# Patient Record
Sex: MALE | Race: WHITE | Employment: FULL TIME | URBAN - METROPOLITAN AREA
[De-identification: names, ages, dates, MRNs, and addresses within clinical notes are randomized per-mention and may not be internally consistent; named-entity substitution may affect disease eponyms.]

---

## 2020-01-08 ENCOUNTER — HISTORICAL (OUTPATIENT)
Dept: RADIOLOGY | Facility: HOSPITAL | Age: 45
End: 2020-01-08

## 2024-06-28 DIAGNOSIS — Z12.9 CANCER SCREENING: ICD-10-CM

## 2024-06-28 DIAGNOSIS — Z00.00 GENERAL MEDICAL EXAM: Primary | ICD-10-CM

## 2024-07-01 DIAGNOSIS — Z00.00 GENERAL MEDICAL EXAM: Primary | ICD-10-CM

## 2024-07-01 DIAGNOSIS — Z12.9 CANCER SCREENING: ICD-10-CM

## 2024-07-12 ENCOUNTER — CLINICAL SUPPORT (OUTPATIENT)
Dept: INTERNAL MEDICINE | Facility: CLINIC | Age: 49
End: 2024-07-12
Payer: OTHER GOVERNMENT

## 2024-07-12 VITALS
WEIGHT: 228 LBS | TEMPERATURE: 98 F | DIASTOLIC BLOOD PRESSURE: 102 MMHG | HEART RATE: 64 BPM | SYSTOLIC BLOOD PRESSURE: 146 MMHG | BODY MASS INDEX: 32.64 KG/M2 | HEIGHT: 70 IN | OXYGEN SATURATION: 98 %

## 2024-07-12 DIAGNOSIS — R06.83 LOUD SNORING: ICD-10-CM

## 2024-07-12 DIAGNOSIS — R79.89 LOW TESTOSTERONE IN MALE: ICD-10-CM

## 2024-07-12 DIAGNOSIS — K76.0 STEATOSIS, LIVER: ICD-10-CM

## 2024-07-12 DIAGNOSIS — Z12.11 SCREENING FOR COLORECTAL CANCER: ICD-10-CM

## 2024-07-12 DIAGNOSIS — Z12.9 CANCER SCREENING: ICD-10-CM

## 2024-07-12 DIAGNOSIS — Z12.12 SCREENING FOR COLORECTAL CANCER: ICD-10-CM

## 2024-07-12 DIAGNOSIS — E78.2 ELEVATED TRIGLYCERIDES WITH HIGH CHOLESTEROL: ICD-10-CM

## 2024-07-12 DIAGNOSIS — R03.0 ELEVATED BLOOD PRESSURE READING: ICD-10-CM

## 2024-07-12 DIAGNOSIS — R07.89 ATYPICAL CHEST PAIN: ICD-10-CM

## 2024-07-12 DIAGNOSIS — Z00.00 GENERAL MEDICAL EXAM: Primary | ICD-10-CM

## 2024-07-12 LAB
(HCYS)2 SERPL-MCNC: 13.1 UMOL/L (ref 5.1–15.4)
25(OH)D3+25(OH)D2 SERPL-MCNC: 32 NG/ML (ref 30–80)
ALBUMIN SERPL-MCNC: 3.9 G/DL (ref 3.5–5)
ALBUMIN/GLOB SERPL: 1.1 RATIO (ref 1.1–2)
ALP SERPL-CCNC: 71 UNIT/L (ref 40–150)
ALT SERPL-CCNC: 41 UNIT/L (ref 0–55)
ANION GAP SERPL CALC-SCNC: 8 MEQ/L
AST SERPL-CCNC: 22 UNIT/L (ref 5–34)
BACTERIA #/AREA URNS AUTO: ABNORMAL /HPF
BASOPHILS # BLD AUTO: 0.06 X10(3)/MCL
BASOPHILS NFR BLD AUTO: 1.1 %
BILIRUB SERPL-MCNC: 1.1 MG/DL
BILIRUB UR QL STRIP.AUTO: NEGATIVE
BUN SERPL-MCNC: 16.4 MG/DL (ref 8.9–20.6)
CALCIUM SERPL-MCNC: 9.4 MG/DL (ref 8.4–10.2)
CHLORIDE SERPL-SCNC: 102 MMOL/L (ref 98–107)
CHOLEST SERPL-MCNC: 220 MG/DL
CHOLEST/HDLC SERPL: 4 {RATIO} (ref 0–5)
CK SERPL-CCNC: 131 U/L (ref 30–200)
CLARITY UR: CLEAR
CO2 SERPL-SCNC: 26 MMOL/L (ref 22–29)
COLOR UR AUTO: YELLOW
CREAT SERPL-MCNC: 1.07 MG/DL (ref 0.73–1.18)
CREAT/UREA NIT SERPL: 15
CRP SERPL HS-MCNC: 3.48 MG/L
DLCO: NORMAL
EOSINOPHIL # BLD AUTO: 0.11 X10(3)/MCL (ref 0–0.9)
EOSINOPHIL NFR BLD AUTO: 2 %
ERYTHROCYTE [DISTWIDTH] IN BLOOD BY AUTOMATED COUNT: 12.7 % (ref 11.5–17)
EST. AVERAGE GLUCOSE BLD GHB EST-MCNC: 114 MG/DL
FERRITIN SERPL-MCNC: 338.82 NG/ML (ref 21.81–274.66)
FEV1/FVC: 72.5 %
FEV1: 3.21 LITERS
FVC: 4.43 LITERS
GFR SERPLBLD CREATININE-BSD FMLA CKD-EPI: >60 ML/MIN/1.73/M2
GLOBULIN SER-MCNC: 3.4 GM/DL (ref 2.4–3.5)
GLUCOSE SERPL-MCNC: 97 MG/DL (ref 74–100)
GLUCOSE UR QL STRIP: NORMAL
GROUP & RH: NORMAL
HBA1C MFR BLD: 5.6 %
HCT VFR BLD AUTO: 49 % (ref 42–52)
HCV AB SERPL QL IA: NONREACTIVE
HDLC SERPL-MCNC: 58 MG/DL (ref 35–60)
HGB BLD-MCNC: 17.3 G/DL (ref 14–18)
HGB UR QL STRIP: NEGATIVE
HIV 1+2 AB+HIV1 P24 AG SERPL QL IA: NONREACTIVE
HYALINE CASTS #/AREA URNS LPF: ABNORMAL /LPF
IMM GRANULOCYTES # BLD AUTO: 0.01 X10(3)/MCL (ref 0–0.04)
IMM GRANULOCYTES NFR BLD AUTO: 0.2 %
KETONES UR QL STRIP: NEGATIVE
LDLC SERPL CALC-MCNC: 113 MG/DL (ref 50–140)
LEUKOCYTE ESTERASE UR QL STRIP: NEGATIVE
LYMPHOCYTES # BLD AUTO: 1.67 X10(3)/MCL (ref 0.6–4.6)
LYMPHOCYTES NFR BLD AUTO: 30.5 %
MCH RBC QN AUTO: 31.7 PG (ref 27–31)
MCHC RBC AUTO-ENTMCNC: 35.3 G/DL (ref 33–36)
MCV RBC AUTO: 89.7 FL (ref 80–94)
MONOCYTES # BLD AUTO: 0.64 X10(3)/MCL (ref 0.1–1.3)
MONOCYTES NFR BLD AUTO: 11.7 %
MUCOUS THREADS URNS QL MICRO: ABNORMAL /LPF
NEUTROPHILS # BLD AUTO: 2.99 X10(3)/MCL (ref 2.1–9.2)
NEUTROPHILS NFR BLD AUTO: 54.5 %
NITRITE UR QL STRIP: NEGATIVE
NRBC BLD AUTO-RTO: 0 %
OHS QRS DURATION: 100 MS
OHS QTC CALCULATION: 407 MS
PH UR STRIP: 5 [PH]
PLATELET # BLD AUTO: 205 X10(3)/MCL (ref 130–400)
PMV BLD AUTO: 11.4 FL (ref 7.4–10.4)
POTASSIUM SERPL-SCNC: 4.1 MMOL/L (ref 3.5–5.1)
PROT SERPL-MCNC: 7.3 GM/DL (ref 6.4–8.3)
PROT UR QL STRIP: NEGATIVE
PSA SERPL-MCNC: 0.75 NG/ML
RBC # BLD AUTO: 5.46 X10(6)/MCL (ref 4.7–6.1)
RBC #/AREA URNS AUTO: ABNORMAL /HPF
SODIUM SERPL-SCNC: 136 MMOL/L (ref 136–145)
SP GR UR STRIP.AUTO: 1.02 (ref 1–1.03)
SQUAMOUS #/AREA URNS LPF: ABNORMAL /HPF
T3FREE SERPL-MCNC: 3.36 PG/ML (ref 1.58–3.91)
T4 FREE SERPL-MCNC: 0.88 NG/DL (ref 0.7–1.48)
TESTOST SERPL-MCNC: 225.85 NG/DL (ref 240.24–870.68)
TLC: NORMAL
TRIGL SERPL-MCNC: 247 MG/DL (ref 34–140)
TSH SERPL-ACNC: 3.34 UIU/ML (ref 0.35–4.94)
URATE SERPL-MCNC: 6.8 MG/DL (ref 3.5–7.2)
UROBILINOGEN UR STRIP-ACNC: NORMAL
VLDLC SERPL CALC-MCNC: 49 MG/DL
WBC # BLD AUTO: 5.48 X10(3)/MCL (ref 4.5–11.5)
WBC #/AREA URNS AUTO: ABNORMAL /HPF

## 2024-07-12 PROCEDURE — 84443 ASSAY THYROID STIM HORMONE: CPT

## 2024-07-12 PROCEDURE — 82728 ASSAY OF FERRITIN: CPT

## 2024-07-12 PROCEDURE — 84481 FREE ASSAY (FT-3): CPT

## 2024-07-12 PROCEDURE — 86901 BLOOD TYPING SEROLOGIC RH(D): CPT | Performed by: FAMILY MEDICINE

## 2024-07-12 PROCEDURE — 84153 ASSAY OF PSA TOTAL: CPT

## 2024-07-12 PROCEDURE — 83090 ASSAY OF HOMOCYSTEINE: CPT

## 2024-07-12 PROCEDURE — 86900 BLOOD TYPING SEROLOGIC ABO: CPT | Performed by: FAMILY MEDICINE

## 2024-07-12 PROCEDURE — 84403 ASSAY OF TOTAL TESTOSTERONE: CPT

## 2024-07-12 PROCEDURE — 87389 HIV-1 AG W/HIV-1&-2 AB AG IA: CPT

## 2024-07-12 PROCEDURE — 85025 COMPLETE CBC W/AUTO DIFF WBC: CPT

## 2024-07-12 PROCEDURE — 93010 ELECTROCARDIOGRAM REPORT: CPT | Mod: ,,, | Performed by: INTERNAL MEDICINE

## 2024-07-12 PROCEDURE — 86803 HEPATITIS C AB TEST: CPT

## 2024-07-12 PROCEDURE — 84439 ASSAY OF FREE THYROXINE: CPT

## 2024-07-12 PROCEDURE — 83036 HEMOGLOBIN GLYCOSYLATED A1C: CPT

## 2024-07-12 PROCEDURE — 81001 URINALYSIS AUTO W/SCOPE: CPT

## 2024-07-12 PROCEDURE — 86141 C-REACTIVE PROTEIN HS: CPT

## 2024-07-12 PROCEDURE — 80061 LIPID PANEL: CPT

## 2024-07-12 PROCEDURE — 82550 ASSAY OF CK (CPK): CPT

## 2024-07-12 PROCEDURE — 82306 VITAMIN D 25 HYDROXY: CPT

## 2024-07-12 PROCEDURE — 84550 ASSAY OF BLOOD/URIC ACID: CPT

## 2024-07-12 PROCEDURE — 80053 COMPREHEN METABOLIC PANEL: CPT

## 2024-07-12 NOTE — ASSESSMENT & PLAN NOTE
Monitor your blood pressure at home. Use a mid range over the counter blood pressure arm cuff. Take your blood pressure daily or every other day both in the morning and late afternoons. Report consistently elevated blood pressures to your primary care physician. These would be blood pressure readings where the top number is above 140 and/or the bottom number is above 90.     The exercise treadmill stress test will be deferred today due to his elevated blood pressure, especially the diastolic >100. When his blood pressure is controlled, we can set him up for the ETT to be done in our office.

## 2024-07-12 NOTE — ASSESSMENT & PLAN NOTE
Maintain a healthy weight and reduce the intake of simple carbohydrates, especially high-glycemic and high-fructose foods and beverages. Reduce the amount of saturated fats in your diet and increase your consumption of fish that contain high amounts of omega-3 fatty acids.

## 2024-07-12 NOTE — ASSESSMENT & PLAN NOTE
Recommend sleep study to evaluate for obstructive sleep apnea. Discuss with your primary care physician.

## 2024-07-12 NOTE — ASSESSMENT & PLAN NOTE
Further workup is needed.   In the meantime:  -Abstain from alcohol, in particular, recommend avoiding heavy alcohol use (I.e. >14 drinks/week or >4 drinks on a given day)  -Start a weight loss routine and aim to lose 5-7% of body weight at a rate of 1-2 lbs/week.

## 2024-07-12 NOTE — ASSESSMENT & PLAN NOTE
Discussed colorectal cancer screening options. Patient would like to wait to discuss further with his PCP and wishes to get this done at a later date.

## 2024-07-12 NOTE — PROGRESS NOTES
Counts include 234 beds at the [facility]  Angeline Flynn MD      Subjective      Alexander Soto is a 49 y.o. year old male, who presents today for a preventive medical evaluation.    This is Mr. Balderrama's first visit to the [facility] for a wellness visit. He reports that since he was last seen by his physician, which was several years ago, he has had no major illnesses or injuries.     He states that in 2011 or 2012 he was evaluated by a cardiologist for intermittent chest pain. At that time he had cardiac testing which included a stress test and ECG which was normal. It was concluded that his chest pain was likely due to stress. Since that time he states that he has always had this intermittent chest pain. He reports that it is unchanged over the years. It is described as substernal, non radiating and mild in nature. It usually occurs in the late afternoons and brought on by stress. He denies any associated shortness of breath, sweating or nausea. He has no discomfort with exercise or night time symptoms.     He reports that his wife states that he snores loudly and has witnessed pauses in his breathing. He states he has daytime fatigue, no headaches and no sleepiness.     He is due for his colorectal cancer screening. He denies no changes in his bowel habits. He recent reports of blood in stools or dark tarry stools. No family history of colon cancer.       CURRENT MEDICAL PROBLEMS     He reports no medical problems.    Current Medications  No current outpatient medications on file.     Care Team 274}  Patient Care Team:  No, Primary Doctor as PCP - General  No, Primary Doctor      PAST MEDICAL HISTORY   274}  Immunizations:  He has not his tetanus booster in the last 10 years.  He has not received the annual flu vaccine.  He  has not taken his Covid Vaccine.     Drug Allergies:274}  Review of patient's allergies indicates:  No Known Allergies     Medical Illnesses:  See Current Medical Problems  and Review of System.    Surgical Illnesses:   History reviewed. No pertinent surgical history.     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 He lives at home with his wife and their 5 children. He is retired from the [employer].     Personal Habits  He does not smoke. He uses smokeless tobacco daily and gets regular dental examinations. He drinks approximately 14 alcoholic beverages per week. He drinks approximately 15 caffeinated beverages per week. He reports that he is currently following a nonrestrictive diet. Nearly all of his meals are prepared at home and eats out 4 times per week. He drinks 25 glasses of water a week. On average, he obtains 5.5 hours of sleep each night.    Exercise History  Currently, he does not follow any exercise routine.     Stress Factors  The patient considers his home life to be moderate stress and his occupation to be of high stress. His greatest source of worry or concern is his job, health, and the state of the country . He feels that he manages his stress very well most of the time. He rates his emotional outlook on life as happy and sad in equal amounts. He rates his overall health as fair.    FAMILY HISTORY     The patient's father is 75 years old and has hypertension and diabetes. His mother is also 75 years old and has hypertension. She had a recent heart surgery for a valve replacement. He has 3 siblings, ages 40, 43, and 53 have no known medical issues. He has 5 children, ages 7, 10, 13, 17 and 19, who are all healthy.    REVIEW OF SYSTEMS     Review of Systems   Constitutional: No fever, no chills, no sweats, no weakness, + fatigue  Eye: No recent visual problems, no blurry vision, no visual disturbances  ENT: No ear pain, no change in hearing, no nasal congestion, no sore throat  Respiratory: No shortness of breath, no cough, no wheezing, no snoring  Cardiovascular: + chest  "pain--See HPI, no palpitations, no peripheral edema, no calf pain or swelling  Gastrointestinal: No nausea, no vomiting, no diarrhea, no constipation, no abdominal pain, no melena or rectal bleeding  Male : No difficulty with urine stream, strength or flow rate. No polyuria, + [id number] night and unchanged, no sexual problems  Genitourinary: No dysuria, no hematuria, no excessive urination, no frequent UTIs, no urinary incontinence  Immunologic: No recurrent fevers, no malaise  Hematology/lymphatics: No swollen lymph glands  Musculoskeletal: No joint pain, no joint swelling, no trouble with gait. +occasional low back pain, non today  Integumentary: No rashes, no skin lesions  Neurologic: Alert, oriented ×4, no abnormal balance, no confusion, no numbness, no tingling, no headache  Psychiatric: No anxiety, no depression, + sleeping problems-trouble falling and staying asleep        Objective     PHYSICAL EXAM   274}  BP (!) 146/102 (BP Location: Left arm, Patient Position: Sitting)   Pulse 64   Temp 97.8 °F (36.6 °C) (Oral)   Ht 5' 10" (1.778 m)   Wt 103.4 kg (228 lb)   SpO2 98%   BMI 32.71 kg/m²     Physical Exam   General: Alert and oriented, no acute distress. well nourished, well kept, good hydration  Eye: Pupils are equal, round and reactive to light, extraocular movements are intact  HEENT: OP clear, moist mucus membranes, no cervical LAD, ear canals clear- normal TM bilaterally  Respiratory: Lungs are clear to auscultation, breath sounds are equal and symmetric  Cardiovascular: Normal rate, regular rhythm, no murmurs, no gallop, no edema  Gastrointestinal: Soft, nontender, nondistended, normal bowel sounds, no organomegaly  Musculoskeletal: Normal range of motion, no swelling, normal gait.  Integumentary: Warm, dry, pink, no rash  Neurologic: Alert, oriented, no focal deficits, normal reflexes  Psychiatric: Cooperative, appropriate mood and affect, normal judgment, nonsuicidal    LABORATORY RESULTS "    274}    Clinical Support on 07/12/2024   Component Date Value Ref Range Status    Uric Acid 07/12/2024 6.8  3.5 - 7.2 mg/dL Final    Color, UA 07/12/2024 Yellow  Yellow, Light-Yellow, Dark Yellow, Zakia, Straw Final    Appearance, UA 07/12/2024 Clear  Clear Final    Specific Gravity, UA 07/12/2024 1.023  1.005 - 1.030 Final    pH, UA 07/12/2024 5.0  5.0 - 8.5 Final    Protein, UA 07/12/2024 Negative  Negative Final    Glucose, UA 07/12/2024 Normal  Negative, Normal Final    Ketones, UA 07/12/2024 Negative  Negative Final    Blood, UA 07/12/2024 Negative  Negative Final    Bilirubin, UA 07/12/2024 Negative  Negative Final    Urobilinogen, UA 07/12/2024 Normal  0.2, 1.0, Normal Final    Nitrites, UA 07/12/2024 Negative  Negative Final    Leukocyte Esterase, UA 07/12/2024 Negative  Negative Final    RBC, UA 07/12/2024 0-5  None Seen, 0-2, 3-5, 0-5 /HPF Final    WBC, UA 07/12/2024 0-5  None Seen, 0-2, 3-5, 0-5 /HPF Final    Bacteria, UA 07/12/2024 None Seen  None Seen /HPF Final    Squamous Epithelial Cells, UA 07/12/2024 None Seen  None Seen /HPF Final    Mucous, UA 07/12/2024 Trace (A)  None Seen /LPF Final    Hyaline Casts, UA 07/12/2024 None Seen  None Seen /lpf Final    TSH 07/12/2024 3.343  0.350 - 4.940 uIU/mL Final    Testosterone Total 07/12/2024 225.85 (L)  240.24 - 870.68 ng/dL Final    Thyroxine Free 07/12/2024 0.88  0.70 - 1.48 ng/dL Final    T3 Free 07/12/2024 3.36  1.58 - 3.91 pg/mL Final    Prostate Specific Antigen 07/12/2024 0.75  <=4.00 ng/mL Final    Cholesterol Total 07/12/2024 220 (H)  <=200 mg/dL Final    HDL Cholesterol 07/12/2024 58  35 - 60 mg/dL Final    Triglyceride 07/12/2024 247 (H)  34 - 140 mg/dL Final    Cholesterol/HDL Ratio 07/12/2024 4  0 - 5 Final    Very Low Density Lipoprotein 07/12/2024 49   Final    LDL Cholesterol 07/12/2024 113.00  50.00 - 140.00 mg/dL Final    Homocysteine 07/12/2024 13.1  5.1 - 15.4 umol/L Final    HIV 07/12/2024 Nonreactive  Nonreactive Final    Hep C Ab  Interp 07/12/2024 Nonreactive  Nonreactive Final    Hemoglobin A1c 07/12/2024 5.6  <=7.0 % Final    Estimated Average Glucose 07/12/2024 114.0  mg/dL Final    Ferritin Level 07/12/2024 338.82 (H)  21.81 - 274.66 ng/mL Final    Group & Rh 07/12/2024 O POS   Final    Creatine Kinase 07/12/2024 131  30 - 200 U/L Final    Sodium 07/12/2024 136  136 - 145 mmol/L Final    Potassium 07/12/2024 4.1  3.5 - 5.1 mmol/L Final    Chloride 07/12/2024 102  98 - 107 mmol/L Final    CO2 07/12/2024 26  22 - 29 mmol/L Final    Glucose 07/12/2024 97  74 - 100 mg/dL Final    Blood Urea Nitrogen 07/12/2024 16.4  8.9 - 20.6 mg/dL Final    Creatinine 07/12/2024 1.07  0.73 - 1.18 mg/dL Final    Calcium 07/12/2024 9.4  8.4 - 10.2 mg/dL Final    Protein Total 07/12/2024 7.3  6.4 - 8.3 gm/dL Final    Albumin 07/12/2024 3.9  3.5 - 5.0 g/dL Final    Globulin 07/12/2024 3.4  2.4 - 3.5 gm/dL Final    Albumin/Globulin Ratio 07/12/2024 1.1  1.1 - 2.0 ratio Final    Bilirubin Total 07/12/2024 1.1  <=1.5 mg/dL Final    ALP 07/12/2024 71  40 - 150 unit/L Final    ALT 07/12/2024 41  0 - 55 unit/L Final    AST 07/12/2024 22  5 - 34 unit/L Final    eGFR 07/12/2024 >60  mL/min/1.73/m2 Final    Anion Gap 07/12/2024 8.0  mEq/L Final    BUN/Creatinine Ratio 07/12/2024 15   Final    C-Reactive Protein High Sensitivity 07/12/2024 3.48  <=5.00 mg/L Final    WBC 07/12/2024 5.48  4.50 - 11.50 x10(3)/mcL Final    RBC 07/12/2024 5.46  4.70 - 6.10 x10(6)/mcL Final    Hgb 07/12/2024 17.3  14.0 - 18.0 g/dL Final    Hct 07/12/2024 49.0  42.0 - 52.0 % Final    MCV 07/12/2024 89.7  80.0 - 94.0 fL Final    MCH 07/12/2024 31.7 (H)  27.0 - 31.0 pg Final    MCHC 07/12/2024 35.3  33.0 - 36.0 g/dL Final    RDW 07/12/2024 12.7  11.5 - 17.0 % Final    Platelet 07/12/2024 205  130 - 400 x10(3)/mcL Final    MPV 07/12/2024 11.4 (H)  7.4 - 10.4 fL Final    Neut % 07/12/2024 54.5  % Final    Lymph % 07/12/2024 30.5  % Final    Mono % 07/12/2024 11.7  % Final    Eos % 07/12/2024 2.0  %  Final    Basophil % 07/12/2024 1.1  % Final    Lymph # 07/12/2024 1.67  0.6 - 4.6 x10(3)/mcL Final    Neut # 07/12/2024 2.99  2.1 - 9.2 x10(3)/mcL Final    Mono # 07/12/2024 0.64  0.1 - 1.3 x10(3)/mcL Final    Eos # 07/12/2024 0.11  0 - 0.9 x10(3)/mcL Final    Baso # 07/12/2024 0.06  <=0.2 x10(3)/mcL Final    IG# 07/12/2024 0.01  0 - 0.04 x10(3)/mcL Final    IG% 07/12/2024 0.2  % Final    NRBC% 07/12/2024 0.0  % Final    Vitamin D 07/12/2024 32  30 - 80 ng/mL Final       Any results pending at the time of completion of note will be routed to the physician and patient for review and outreach performed if indicated.     IMAGING RESULTS     see official reports    Calcium Score--Your total calcium score is 0.  Very little coronary heart disease risk.  Severe hepatic steatosis.    IN OFFICE TEST RESULTS     Vision/Auditory  Hearing Screening    500Hz 1000Hz 2000Hz 4000Hz   Right ear 40 40 40 40   Left ear 40 40 40 40        Spirometry   FVC 87% predictive  FEV1 81% predictive  FEV1/FVC 93% predictive  Interpretation: Normal Spirometry    Resting ECG:  Vent. Rate : 063 BPM     Atrial Rate : 063 BPM        P-R Int : 218 ms          QRS Dur : 100 ms         QT Int : 398 ms       P-R-T Axes : 005 038 033 degrees        QTc Int : 407 ms     Sinus rhythm with 1st degree A-V block   Otherwise normal ECG     Exercise Treadmill Stress Test  Deferred due to elevated blood pressure.     InBody Assessment:   07/12/2024    BMI: 32.8    Percent Body Fat: 30.9%    Visceral Fat Area: 143.6 cm2    Recommendations: Body fat mass loss of 43 lbs          Assessment    IMPRESSION & PLAN    274}  Cancer Screening   Impression   Not all recommended cancer screening exams are up to date.   Plan You are due for your colorectal cancer screening     Immunizations   Impression Not all recommended immunizations are up to date. These include: Tetanus vaccine, Influenza vaccine, and Covid vaccine   Plan It is recommended that you speak to your  primary care physician about receiving your vaccinations.      Physical Exam   Impression Normal except for:   -Elevated blood pressure reading:    Plan Monitor your blood pressure at home. Use a mid range over the counter blood pressure arm cuff. Take your blood pressure daily or every other day both in the morning and late afternoons. Report consistently elevated blood pressures to your primary care physician. These would be blood pressure readings where the top number is above 140 and/or the bottom number is above 90.        Laboratory Results   Impression Normal except for:   -Elevated ferritin level: further testing needed.  -Elevated triglycerides: reduce the intake of simple carbohydrates and restrict or abstain from alcohol  -Low testosterone level: recommend further evaluation and treatment.   Plan See above     Imaging Results   Impression Normal except for:   -Hepatic steatosis on CT: Further workup needed to determine cause. See further recommendations listed below.    Plan See above     Hearing Screening   Impression Normal   Plan Repeat in 1 year.     Vision Screening    Impression Normal   Plan Repeat in 1 year.     Lung Function Screening   Impression Normal   Plan Repeat in 1 year.     Electrocardiogram   Impression First degree av block noted on ECG. Otherwise normal.    Plan Repeat in 1 year.     Cardiovascular Risk Assessment   Impression FAYE score: 2%   Low Risk (<5%)  The Multi-Ethnic Study of Atherosclerosis (FAYE) Risk Score is a cardiovascular risk calculator that predicts the likelihood of coronary heart disease over 10 years. It can be used with your coronary artery calcium values, which are obtained from a chest CT scan.    Plan Follow a heart healthy diet (see details below), maintain a healthy blood pressure (<140/80) and weight (BMI 25-29.9). Follow a regular aerobic exercise routine (see exercise prescription below)  Start an aerobic exercise routine after your evaluation and  treatment of your blood pressure and atypical chest pain.  Aim for at least 150 minutes of moderate intensity exercise each week or 75 minutes of vigorous aerobic activity per week --  or an equivalent mix of the two.  Activities such as brisk walking, running, swimming, biking and other aerobic exercises are all good options.  and Consider a diet such as the Dietary Approaches to Stop Hypertension (DASH) diet which can help to lower blood pressure.     In Body Assessment   Impression  See results and recommendations above   Plan Exercise Prescription #1         Type: Aerobic: Brisk walking         Frequency: 3 sessions/week         Intensity: Moderate*          Time: 20-30 minutes  *During moderate intensity exercise, a person is too winded to sing but is not so winded they cannot talk.     Exercise Prescription #2         Type: Aerobic: walking, stationary cycling, aquatic exercise, running         Frequency: 3-5 sessions/week         Intensity: Moderate (RPE: 10-13)          Time: 30 minutes (150 minutes/week)              Plus         Type: Strength/Resistance Training: exercises using resistance bands, weight machines, handheld  weights or body weight         Frequency: 2-3 session/week               Plus         Type: Flexibility training: pre/post workout stretching, yoga, kaushik chi         Frequency: 5-7 sessions/week         Time: 5-10 minutes       Plan Diet Recommendations:  Follow a heart healthy diet such as the Mediterranean-style diet.   Eat an overall healthy dietary pattern that emphasizes:   -a wide variety of fruits and vegetables   -whole grains and products made up mostly of whole grains   -healthy sources of protein (mostly plants such as legumes and nuts; fish and seafood; low fat or nonfat dairy; and, if you eat meat and poultry, ensuring it is lean and unprocessed)   -liquid non-tropical vegetable oils   -minimally processed foods   -minimized intake of added sugars   -food prepared with  "little or no salt   -limited or preferably no alcohol intake        TDEE: 2,294 calories  This is your "Total Daily Energy Expenditure" and is the amount of calories required to maintain current body weight when your activity level is factored in.    Recommended daily calorie intake to promote weight loss with no exercise: 1,794 calories/day  Recommended daily calorie intake to promote weight loss with light exercise (1-2 days/week): 2,128 calories/day  Recommended daily calorie intake to promote weight loss with moderate exercise (3-5 days/week): 2,963 calories/day    BMR: 1,918 calories  This is your "Basal Metabolic Rate". This is the amount of calories required to maintain current body weight with no activity.         MEDICAL PROBLEMS ADDRESSED TODAY     1. General medical exam  -     see above    2. Cancer screening  Comments:  Your Galleri test result will be reported in your [facility] patient portal in 7-10 days. I will reach out regarding the results as well.  Orders:  -     Galleri Multi-Cancer Screen    3. Elevated blood pressure reading  Assessment & Plan:  Monitor your blood pressure at home. Use a mid range over the counter blood pressure arm cuff. Take your blood pressure daily or every other day both in the morning and late afternoons. Report consistently elevated blood pressures to your primary care physician. These would be blood pressure readings where the top number is above 140 and/or the bottom number is above 90.     The exercise treadmill stress test will be deferred today due to his elevated blood pressure, especially the diastolic >100. When his blood pressure is controlled, we can set him up for the ETT to be done in our office.     Orders:  -     Ambulatory referral/consult to Family Practice; Future; Expected date: 07/19/2024    4. Screening for colorectal cancer  Assessment & Plan:  Discussed colorectal cancer screening options. Patient would like to wait to discuss further with his PCP " and wishes to get this done at a later date.      5. Steatosis, liver  Assessment & Plan:  Further workup is needed.   In the meantime:  -Abstain from alcohol, in particular, recommend avoiding heavy alcohol use (I.e. >14 drinks/week or >4 drinks on a given day)  -Start a weight loss routine and aim to lose 5-7% of body weight at a rate of 1-2 lbs/week.     Orders:  -     Ambulatory referral/consult to Family Practice; Future; Expected date: 07/19/2024    6. Elevated triglycerides with high cholesterol  Assessment & Plan:  Maintain a healthy weight and reduce the intake of simple carbohydrates, especially high-glycemic and high-fructose foods and beverages. Reduce the amount of saturated fats in your diet and increase your consumption of fish that contain high amounts of omega-3 fatty acids.     Orders:  -     Ambulatory referral/consult to Family Practice; Future; Expected date: 07/19/2024    7. Low testosterone in male  Assessment & Plan:  Recommend further evaluation and treatment if indicated.       FINAL RECOMMENDATIONS     Get established with a primary care physician to discuss your elevated blood pressure, elevated ferritin, low testosterone and liver findings on CT. I have sent a referral to Dr. Rajiv Reyna located at [address] [place] [address]. Telephone: [phone].   Monitor and document your home blood pressure. Report consistently elevated blood pressures to your primary care physician.   Work towards a 5-7% body weight loss over the next several months.   Reduce alcohol consumption and consider abstaining. Talk to your doctor about how to quit all together.   Get your colon cancer screening test done.  Discuss the need for a sleep study to be evaluated for obstructive sleep apnea due to your report of loud snoring and witnessed pauses in breathing.   Discuss the need for returning to the [facility] for an exercise stress test vs. cardiology evaluation concerning your report of  chronic intermittent chest pain.     Early identification and prevention are the keys to maintaining overall health and prevention of chronic diseases. For this reason, I recommend yearly physical examinations through this program or with your primary care physician.     Follow up in about 1 year (around 7/12/2025) for an [facility] Physical.    It was a pleasure taking care of you, Mr. Soto. Thank you for using the [facility].            [facility]  [address]   [place], [place]  [address]

## 2024-07-15 ENCOUNTER — TELEPHONE (OUTPATIENT)
Dept: HEMATOLOGY/ONCOLOGY | Facility: CLINIC | Age: 49
End: 2024-07-15
Payer: OTHER GOVERNMENT

## 2024-07-15 NOTE — TELEPHONE ENCOUNTER
I called patient to review Galleri testing, lvm with details and sending mychart with additional info.

## 2024-07-31 ENCOUNTER — PATIENT MESSAGE (OUTPATIENT)
Dept: HEMATOLOGY/ONCOLOGY | Facility: CLINIC | Age: 49
End: 2024-07-31
Payer: OTHER GOVERNMENT

## 2024-08-14 ENCOUNTER — OFFICE VISIT (OUTPATIENT)
Dept: FAMILY MEDICINE | Facility: CLINIC | Age: 49
End: 2024-08-14
Payer: OTHER GOVERNMENT

## 2024-08-14 VITALS
HEART RATE: 86 BPM | OXYGEN SATURATION: 98 % | WEIGHT: 221.19 LBS | BODY MASS INDEX: 31.67 KG/M2 | DIASTOLIC BLOOD PRESSURE: 72 MMHG | TEMPERATURE: 98 F | HEIGHT: 70 IN | SYSTOLIC BLOOD PRESSURE: 120 MMHG

## 2024-08-14 DIAGNOSIS — Z00.00 WELL ADULT EXAM: Primary | ICD-10-CM

## 2024-08-14 DIAGNOSIS — K76.0 FATTY LIVER: ICD-10-CM

## 2024-08-14 DIAGNOSIS — E78.2 MIXED HYPERLIPIDEMIA: ICD-10-CM

## 2024-08-14 DIAGNOSIS — R79.89 LOW TESTOSTERONE IN MALE: ICD-10-CM

## 2024-08-14 DIAGNOSIS — K76.0 STEATOSIS, LIVER: ICD-10-CM

## 2024-08-14 DIAGNOSIS — R03.0 ELEVATED BLOOD PRESSURE READING: ICD-10-CM

## 2024-08-14 PROCEDURE — 99396 PREV VISIT EST AGE 40-64: CPT | Mod: ,,, | Performed by: FAMILY MEDICINE

## 2024-08-14 NOTE — ASSESSMENT & PLAN NOTE
He is completely asymptomatic at this time.      We will recheck his level next year as part of his wellness evaluation.  I anticipate his testosterone level well improve with his dietary modification and regular exercise.

## 2024-08-14 NOTE — ASSESSMENT & PLAN NOTE
Continue therapeutic lifestyle changes     Currently, liver enzymes are normal.    Continue alcohol avoidance    We will reassess on return to clinic next year

## 2024-08-14 NOTE — ASSESSMENT & PLAN NOTE
Regular exercise and dietary modifications     Continue therapeutic lifestyle changes and weight loss     Recommended colorectal cancer screening.  Patient declined at this time.

## 2024-08-14 NOTE — PROGRESS NOTES
"SUBJECTIVE:  HPI    Alexander Soto is a 49 y.o. male here for Establish Care (Review labs).     Patient was referred here from [facility] to establish care with a primary care provider.      I reviewed the [facility] progress note and associated labs.    Since that visit, he has made a series of lifestyle modifications.  He has significantly cut back on alcohol consumption.  He has has been more disciplined with diet and exercise.  He is walking about 4 miles at least 4 days per week.  He has lost about 10-15 lb since that visit.  He finds that he is more well rested, sleeps better and feels better.  He has also had diminished snoring.    Tacos's allergies, medications, history, and problem list were updated as appropriate.    ROS:  Pertinent ROS as above, otherwise negative 12-point review of systems    OBJECTIVE:  Vital signs  Visit Vitals  /72 (BP Location: Right arm, Patient Position: Sitting)   Pulse 86   Temp 97.7 °F (36.5 °C) (Temporal)   Ht 5' 9.69" (1.77 m)   Wt 100.3 kg (221 lb 3.2 oz)   SpO2 98%   BMI 32.03 kg/m²          PHYSICAL EXAM:  General: Awake, alert, no acute distress    Chemistry:  Lab Results   Component Value Date     07/12/2024    K 4.1 07/12/2024    BUN 16.4 07/12/2024    CREATININE 1.07 07/12/2024    EGFRNORACEVR >60 07/12/2024    GLUCOSE 97 07/12/2024    CALCIUM 9.4 07/12/2024    ALKPHOS 71 07/12/2024    AST 22 07/12/2024    ALT 41 07/12/2024    GFITVKYQ05VL 32 07/12/2024    TSH 3.343 07/12/2024    YQLLSB8KWDJ 0.88 07/12/2024    PSA 0.75 07/12/2024        Lab Results   Component Value Date    HGBA1C 5.6 07/12/2024        Hematology:  Lab Results   Component Value Date    WBC 5.48 07/12/2024    HGB 17.3 07/12/2024    MCV 89.7 07/12/2024     07/12/2024       Lipid Panel:  Lab Results   Component Value Date    CHOL 220 (H) 07/12/2024    HDL 58 07/12/2024    TRIG 247 (H) 07/12/2024    TOTALCHOLEST 4 07/12/2024        ASSESSMENT/PLAN:  1. Well " adult exam  Assessment & Plan:  Regular exercise and dietary modifications     Continue therapeutic lifestyle changes and weight loss     Recommended colorectal cancer screening.  Patient declined at this time.          Orders:  -     Lipid Panel; Future; Expected date: 08/14/2025  -     Comprehensive Metabolic Panel; Future; Expected date: 08/14/2025  -     TSH; Future; Expected date: 08/14/2025  -     CBC Auto Differential; Future; Expected date: 08/14/2025  -     Prostate Specific Antigen, Diagnostic; Future; Expected date: 08/14/2025  -     Hemoglobin A1C; Future; Expected date: 08/14/2025  -     Testosterone; Future; Expected date: 08/14/2025    2. Elevated blood pressure reading  Assessment & Plan:  Markedly improved with therapeutic lifestyle changes     Continue therapeutic lifestyle changes    Orders:  -     Ambulatory referral/consult to Family Practice    3. Steatosis, liver  Overview:  July 2024: Incidental finding on coronary calcium score testing with severe hepatic steatosis seen    Assessment & Plan:  Continue therapeutic lifestyle changes     Currently, liver enzymes are normal.    Continue alcohol avoidance    We will reassess on return to clinic next year    Orders:  -     Ambulatory referral/consult to Family Practice    4. Mixed hyperlipidemia  Assessment & Plan:  Continue therapeutic lifestyle changes      5. Low testosterone in male  Assessment & Plan:  He is completely asymptomatic at this time.      We will recheck his level next year as part of his wellness evaluation.  I anticipate his testosterone level well improve with his dietary modification and regular exercise.      6. Fatty liver  Overview:  July 2024: Incidental finding on coronary calcium score testing with severe hepatic steatosis seen    Assessment & Plan:  Continue therapeutic lifestyle changes     Currently, liver enzymes are normal.    Continue alcohol avoidance    We will reassess on return to clinic next year        Follow  Up:  Follow up in about 1 year (around 8/14/2025) for Well Adult, Fasting labs.

## 2024-08-23 ENCOUNTER — TELEPHONE (OUTPATIENT)
Dept: INTERNAL MEDICINE | Facility: CLINIC | Age: 49
End: 2024-08-23
Payer: OTHER GOVERNMENT

## 2025-03-14 DIAGNOSIS — Z00.00 GENERAL MEDICAL EXAM: Primary | ICD-10-CM

## 2025-08-18 PROCEDURE — 80053 COMPREHEN METABOLIC PANEL: CPT | Performed by: FAMILY MEDICINE

## 2025-08-18 PROCEDURE — 84153 ASSAY OF PSA TOTAL: CPT | Performed by: FAMILY MEDICINE

## 2025-08-18 PROCEDURE — 85025 COMPLETE CBC W/AUTO DIFF WBC: CPT | Performed by: FAMILY MEDICINE

## 2025-08-18 PROCEDURE — 83036 HEMOGLOBIN GLYCOSYLATED A1C: CPT | Performed by: FAMILY MEDICINE

## 2025-08-18 PROCEDURE — 84403 ASSAY OF TOTAL TESTOSTERONE: CPT | Performed by: FAMILY MEDICINE

## 2025-08-18 PROCEDURE — 80061 LIPID PANEL: CPT | Performed by: FAMILY MEDICINE

## 2025-08-18 PROCEDURE — 84443 ASSAY THYROID STIM HORMONE: CPT | Performed by: FAMILY MEDICINE

## 2025-08-28 ENCOUNTER — OFFICE VISIT (OUTPATIENT)
Dept: FAMILY MEDICINE | Facility: CLINIC | Age: 50
End: 2025-08-28
Payer: OTHER GOVERNMENT

## 2025-08-28 VITALS
HEIGHT: 70 IN | SYSTOLIC BLOOD PRESSURE: 141 MMHG | BODY MASS INDEX: 32.93 KG/M2 | DIASTOLIC BLOOD PRESSURE: 82 MMHG | HEART RATE: 75 BPM | WEIGHT: 230 LBS | OXYGEN SATURATION: 96 % | TEMPERATURE: 98 F

## 2025-08-28 DIAGNOSIS — Z12.11 SCREENING FOR COLON CANCER: ICD-10-CM

## 2025-08-28 DIAGNOSIS — R03.0 ELEVATED BLOOD PRESSURE READING: ICD-10-CM

## 2025-08-28 DIAGNOSIS — K76.0 FATTY LIVER: ICD-10-CM

## 2025-08-28 DIAGNOSIS — N13.8 BPH WITH OBSTRUCTION/LOWER URINARY TRACT SYMPTOMS: ICD-10-CM

## 2025-08-28 DIAGNOSIS — E78.2 MIXED HYPERLIPIDEMIA: ICD-10-CM

## 2025-08-28 DIAGNOSIS — R79.89 LOW TESTOSTERONE IN MALE: ICD-10-CM

## 2025-08-28 DIAGNOSIS — Z00.01 ENCOUNTER FOR WELL ADULT EXAM WITH ABNORMAL FINDINGS: Primary | ICD-10-CM

## 2025-08-28 DIAGNOSIS — N40.1 BPH WITH OBSTRUCTION/LOWER URINARY TRACT SYMPTOMS: ICD-10-CM

## 2025-08-28 PROBLEM — Z00.00 WELL ADULT EXAM: Status: RESOLVED | Noted: 2024-07-12 | Resolved: 2025-08-28

## 2025-08-28 PROCEDURE — 99396 PREV VISIT EST AGE 40-64: CPT | Mod: ,,, | Performed by: FAMILY MEDICINE
